# Patient Record
Sex: MALE | URBAN - METROPOLITAN AREA
[De-identification: names, ages, dates, MRNs, and addresses within clinical notes are randomized per-mention and may not be internally consistent; named-entity substitution may affect disease eponyms.]

---

## 2020-08-27 ENCOUNTER — APPOINTMENT (RX ONLY)
Dept: URBAN - METROPOLITAN AREA CLINIC 157 | Facility: CLINIC | Age: 65
Setting detail: DERMATOLOGY
End: 2020-08-27

## 2020-08-27 DIAGNOSIS — R52 PAIN, UNSPECIFIED: ICD-10-CM

## 2020-08-27 DIAGNOSIS — L81.4 OTHER MELANIN HYPERPIGMENTATION: ICD-10-CM

## 2020-08-27 PROCEDURE — ? COUNSELING

## 2020-08-27 PROCEDURE — 99202 OFFICE O/P NEW SF 15 MIN: CPT

## 2020-08-27 PROCEDURE — ? ADDITIONAL NOTES

## 2020-08-27 ASSESSMENT — LOCATION ZONE DERM
LOCATION ZONE: ARM
LOCATION ZONE: TRUNK

## 2020-08-27 ASSESSMENT — LOCATION DETAILED DESCRIPTION DERM
LOCATION DETAILED: RIGHT SUPERIOR MEDIAL UPPER BACK
LOCATION DETAILED: LEFT SUPERIOR UPPER BACK
LOCATION DETAILED: RIGHT POSTERIOR SHOULDER

## 2020-08-27 ASSESSMENT — LOCATION SIMPLE DESCRIPTION DERM
LOCATION SIMPLE: LEFT UPPER BACK
LOCATION SIMPLE: RIGHT SHOULDER
LOCATION SIMPLE: RIGHT UPPER BACK

## 2020-08-27 NOTE — PROCEDURE: ADDITIONAL NOTES
Additional Notes: Recommend patient to follow up with a Physical Medicine and Rehabilitation sent information to follow up with Dr. Patricio Lowery. Office number [phone]
Detail Level: Simple
Additional Notes: - patient hunched over in pain during full exam\\n- could barely speak due to pain\\n- no skin symptoms or findings\\n- discussed seeing another type of specialist who specializes in muscles and nerves

## 2020-08-27 NOTE — HPI: BODY LOCATION - TRUNK
How Severe Is Your Condition?: severe
Additional History: Patient states he is unbearable pain, has a stinging sensation like he is getting stung from bees. Has had multiple MRI’s in that past with no answers. Has tried Lyrica, Cymbalta, Tramadol, and Gabapentin with no improvement.